# Patient Record
Sex: FEMALE | Race: WHITE | NOT HISPANIC OR LATINO | Employment: OTHER | URBAN - METROPOLITAN AREA
[De-identification: names, ages, dates, MRNs, and addresses within clinical notes are randomized per-mention and may not be internally consistent; named-entity substitution may affect disease eponyms.]

---

## 2022-11-12 ENCOUNTER — APPOINTMENT (OUTPATIENT)
Dept: CT IMAGING | Facility: CLINIC | Age: 77
End: 2022-11-12
Attending: EMERGENCY MEDICINE
Payer: COMMERCIAL

## 2022-11-12 ENCOUNTER — HOSPITAL ENCOUNTER (OUTPATIENT)
Facility: CLINIC | Age: 77
Setting detail: OBSERVATION
Discharge: HOME | End: 2022-11-13
Attending: EMERGENCY MEDICINE | Admitting: INTERNAL MEDICINE
Payer: COMMERCIAL

## 2022-11-12 ENCOUNTER — APPOINTMENT (OUTPATIENT)
Dept: MRI IMAGING | Facility: CLINIC | Age: 77
End: 2022-11-12
Attending: EMERGENCY MEDICINE
Payer: COMMERCIAL

## 2022-11-12 DIAGNOSIS — D32.9 MENINGIOMA (H): ICD-10-CM

## 2022-11-12 DIAGNOSIS — E04.1 THYROID NODULE: ICD-10-CM

## 2022-11-12 DIAGNOSIS — G45.9 TIA (TRANSIENT ISCHEMIC ATTACK): ICD-10-CM

## 2022-11-12 LAB
ALBUMIN UR-MCNC: NEGATIVE MG/DL
ANION GAP SERPL CALCULATED.3IONS-SCNC: 9 MMOL/L (ref 3–14)
APPEARANCE UR: CLEAR
APTT PPP: 25 SECONDS (ref 22–38)
ATRIAL RATE - MUSE: 57 BPM
BASOPHILS # BLD AUTO: 0 10E3/UL (ref 0–0.2)
BASOPHILS NFR BLD AUTO: 1 %
BILIRUB UR QL STRIP: NEGATIVE
BUN SERPL-MCNC: 16 MG/DL (ref 7–30)
CALCIUM SERPL-MCNC: 8.8 MG/DL (ref 8.5–10.1)
CHLORIDE BLD-SCNC: 109 MMOL/L (ref 94–109)
CHOLEST SERPL-MCNC: 166 MG/DL
CO2 SERPL-SCNC: 24 MMOL/L (ref 20–32)
COLOR UR AUTO: ABNORMAL
CREAT SERPL-MCNC: 0.8 MG/DL (ref 0.52–1.04)
DIASTOLIC BLOOD PRESSURE - MUSE: NORMAL MMHG
EOSINOPHIL # BLD AUTO: 0.2 10E3/UL (ref 0–0.7)
EOSINOPHIL NFR BLD AUTO: 3 %
ERYTHROCYTE [DISTWIDTH] IN BLOOD BY AUTOMATED COUNT: 13.3 % (ref 10–15)
GFR SERPL CREATININE-BSD FRML MDRD: 75 ML/MIN/1.73M2
GLUCOSE BLD-MCNC: 131 MG/DL (ref 70–99)
GLUCOSE BLDC GLUCOMTR-MCNC: 118 MG/DL (ref 70–99)
GLUCOSE BLDC GLUCOMTR-MCNC: 196 MG/DL (ref 70–99)
GLUCOSE UR STRIP-MCNC: NEGATIVE MG/DL
HCT VFR BLD AUTO: 44.3 % (ref 35–47)
HDLC SERPL-MCNC: 54 MG/DL
HGB BLD-MCNC: 14.7 G/DL (ref 11.7–15.7)
HGB UR QL STRIP: ABNORMAL
HOLD SPECIMEN: NORMAL
HOLD SPECIMEN: NORMAL
IMM GRANULOCYTES # BLD: 0 10E3/UL
IMM GRANULOCYTES NFR BLD: 0 %
INR PPP: 1.03 (ref 0.85–1.15)
INTERPRETATION ECG - MUSE: NORMAL
KETONES UR STRIP-MCNC: NEGATIVE MG/DL
LDLC SERPL CALC-MCNC: 77 MG/DL
LEUKOCYTE ESTERASE UR QL STRIP: NEGATIVE
LYMPHOCYTES # BLD AUTO: 3.7 10E3/UL (ref 0.8–5.3)
LYMPHOCYTES NFR BLD AUTO: 50 %
MCH RBC QN AUTO: 32.1 PG (ref 26.5–33)
MCHC RBC AUTO-ENTMCNC: 33.2 G/DL (ref 31.5–36.5)
MCV RBC AUTO: 97 FL (ref 78–100)
MONOCYTES # BLD AUTO: 0.5 10E3/UL (ref 0–1.3)
MONOCYTES NFR BLD AUTO: 7 %
MUCOUS THREADS #/AREA URNS LPF: PRESENT /LPF
NEUTROPHILS # BLD AUTO: 2.8 10E3/UL (ref 1.6–8.3)
NEUTROPHILS NFR BLD AUTO: 39 %
NITRATE UR QL: NEGATIVE
NONHDLC SERPL-MCNC: 112 MG/DL
NRBC # BLD AUTO: 0 10E3/UL
NRBC BLD AUTO-RTO: 0 /100
P AXIS - MUSE: 73 DEGREES
PH UR STRIP: 6 [PH] (ref 5–7)
PLATELET # BLD AUTO: 228 10E3/UL (ref 150–450)
POTASSIUM BLD-SCNC: 3.5 MMOL/L (ref 3.4–5.3)
PR INTERVAL - MUSE: 286 MS
QRS DURATION - MUSE: 86 MS
QT - MUSE: 434 MS
QTC - MUSE: 422 MS
R AXIS - MUSE: 6 DEGREES
RBC # BLD AUTO: 4.58 10E6/UL (ref 3.8–5.2)
RBC URINE: 5 /HPF
SARS-COV-2 RNA RESP QL NAA+PROBE: NEGATIVE
SODIUM SERPL-SCNC: 142 MMOL/L (ref 133–144)
SP GR UR STRIP: 1.02 (ref 1–1.03)
SQUAMOUS EPITHELIAL: 1 /HPF
SYSTOLIC BLOOD PRESSURE - MUSE: NORMAL MMHG
T AXIS - MUSE: 36 DEGREES
TRIGL SERPL-MCNC: 177 MG/DL
TROPONIN I SERPL HS-MCNC: 7 NG/L
UROBILINOGEN UR STRIP-MCNC: NORMAL MG/DL
VENTRICULAR RATE- MUSE: 57 BPM
WBC # BLD AUTO: 7.3 10E3/UL (ref 4–11)
WBC URINE: 1 /HPF

## 2022-11-12 PROCEDURE — 70496 CT ANGIOGRAPHY HEAD: CPT

## 2022-11-12 PROCEDURE — 99220 PR INITIAL OBSERVATION CARE,LEVEL III: CPT | Performed by: INTERNAL MEDICINE

## 2022-11-12 PROCEDURE — 250N000013 HC RX MED GY IP 250 OP 250 PS 637: Performed by: EMERGENCY MEDICINE

## 2022-11-12 PROCEDURE — C9803 HOPD COVID-19 SPEC COLLECT: HCPCS

## 2022-11-12 PROCEDURE — 255N000002 HC RX 255 OP 636: Performed by: EMERGENCY MEDICINE

## 2022-11-12 PROCEDURE — 250N000013 HC RX MED GY IP 250 OP 250 PS 637: Performed by: INTERNAL MEDICINE

## 2022-11-12 PROCEDURE — 70498 CT ANGIOGRAPHY NECK: CPT

## 2022-11-12 PROCEDURE — 85730 THROMBOPLASTIN TIME PARTIAL: CPT | Performed by: EMERGENCY MEDICINE

## 2022-11-12 PROCEDURE — 70450 CT HEAD/BRAIN W/O DYE: CPT

## 2022-11-12 PROCEDURE — 82310 ASSAY OF CALCIUM: CPT | Performed by: EMERGENCY MEDICINE

## 2022-11-12 PROCEDURE — 82962 GLUCOSE BLOOD TEST: CPT

## 2022-11-12 PROCEDURE — 85610 PROTHROMBIN TIME: CPT | Performed by: EMERGENCY MEDICINE

## 2022-11-12 PROCEDURE — 250N000009 HC RX 250: Performed by: EMERGENCY MEDICINE

## 2022-11-12 PROCEDURE — 84484 ASSAY OF TROPONIN QUANT: CPT | Performed by: EMERGENCY MEDICINE

## 2022-11-12 PROCEDURE — 93005 ELECTROCARDIOGRAM TRACING: CPT

## 2022-11-12 PROCEDURE — 250N000011 HC RX IP 250 OP 636: Performed by: EMERGENCY MEDICINE

## 2022-11-12 PROCEDURE — 80061 LIPID PANEL: CPT | Performed by: INTERNAL MEDICINE

## 2022-11-12 PROCEDURE — A9585 GADOBUTROL INJECTION: HCPCS | Performed by: EMERGENCY MEDICINE

## 2022-11-12 PROCEDURE — G0378 HOSPITAL OBSERVATION PER HR: HCPCS

## 2022-11-12 PROCEDURE — 99291 CRITICAL CARE FIRST HOUR: CPT | Mod: 25

## 2022-11-12 PROCEDURE — 70553 MRI BRAIN STEM W/O & W/DYE: CPT

## 2022-11-12 PROCEDURE — 36415 COLL VENOUS BLD VENIPUNCTURE: CPT | Performed by: EMERGENCY MEDICINE

## 2022-11-12 PROCEDURE — 81001 URINALYSIS AUTO W/SCOPE: CPT | Performed by: EMERGENCY MEDICINE

## 2022-11-12 PROCEDURE — 83036 HEMOGLOBIN GLYCOSYLATED A1C: CPT | Performed by: INTERNAL MEDICINE

## 2022-11-12 PROCEDURE — U0003 INFECTIOUS AGENT DETECTION BY NUCLEIC ACID (DNA OR RNA); SEVERE ACUTE RESPIRATORY SYNDROME CORONAVIRUS 2 (SARS-COV-2) (CORONAVIRUS DISEASE [COVID-19]), AMPLIFIED PROBE TECHNIQUE, MAKING USE OF HIGH THROUGHPUT TECHNOLOGIES AS DESCRIBED BY CMS-2020-01-R: HCPCS | Performed by: EMERGENCY MEDICINE

## 2022-11-12 PROCEDURE — 85025 COMPLETE CBC W/AUTO DIFF WBC: CPT | Performed by: EMERGENCY MEDICINE

## 2022-11-12 RX ORDER — NICOTINE POLACRILEX 4 MG
15-30 LOZENGE BUCCAL
Status: DISCONTINUED | OUTPATIENT
Start: 2022-11-12 | End: 2022-11-13 | Stop reason: HOSPADM

## 2022-11-12 RX ORDER — DEXTROSE MONOHYDRATE 25 G/50ML
25-50 INJECTION, SOLUTION INTRAVENOUS
Status: DISCONTINUED | OUTPATIENT
Start: 2022-11-12 | End: 2022-11-13 | Stop reason: HOSPADM

## 2022-11-12 RX ORDER — BLOOD SUGAR DIAGNOSTIC
STRIP MISCELLANEOUS
COMMUNITY
Start: 2021-06-02

## 2022-11-12 RX ORDER — IOPAMIDOL 755 MG/ML
75 INJECTION, SOLUTION INTRAVASCULAR ONCE
Status: COMPLETED | OUTPATIENT
Start: 2022-11-12 | End: 2022-11-12

## 2022-11-12 RX ORDER — ASPIRIN 325 MG
325 TABLET ORAL ONCE
Status: COMPLETED | OUTPATIENT
Start: 2022-11-12 | End: 2022-11-12

## 2022-11-12 RX ORDER — GLIPIZIDE 2.5 MG/1
2.5 TABLET, EXTENDED RELEASE ORAL
COMMUNITY
Start: 2021-12-01 | End: 2022-12-01

## 2022-11-12 RX ORDER — CLOPIDOGREL BISULFATE 75 MG/1
300 TABLET ORAL ONCE
Status: COMPLETED | OUTPATIENT
Start: 2022-11-12 | End: 2022-11-12

## 2022-11-12 RX ORDER — GADOBUTROL 604.72 MG/ML
5 INJECTION INTRAVENOUS ONCE
Status: COMPLETED | OUTPATIENT
Start: 2022-11-12 | End: 2022-11-12

## 2022-11-12 RX ADMIN — IOPAMIDOL 75 ML: 755 INJECTION, SOLUTION INTRAVENOUS at 17:21

## 2022-11-12 RX ADMIN — CLOPIDOGREL BISULFATE 300 MG: 75 TABLET ORAL at 23:42

## 2022-11-12 RX ADMIN — ASPIRIN 325 MG ORAL TABLET 325 MG: 325 PILL ORAL at 18:27

## 2022-11-12 RX ADMIN — GADOBUTROL 5 ML: 604.72 INJECTION INTRAVENOUS at 19:23

## 2022-11-12 RX ADMIN — SODIUM CHLORIDE 100 ML: 900 INJECTION INTRAVENOUS at 17:21

## 2022-11-12 ASSESSMENT — ENCOUNTER SYMPTOMS
NAUSEA: 0
WEAKNESS: 0
HEADACHES: 0
COUGH: 0
VOMITING: 0
CHILLS: 0
DIARRHEA: 0
SPEECH DIFFICULTY: 1
NUMBNESS: 0
FEVER: 0

## 2022-11-12 ASSESSMENT — ACTIVITIES OF DAILY LIVING (ADL)
ADLS_ACUITY_SCORE: 35

## 2022-11-12 ASSESSMENT — VISUAL ACUITY: OU: NORMAL ACUITY

## 2022-11-12 NOTE — PHARMACY-ADMISSION MEDICATION HISTORY
Pharmacy Medication History  Admission medication history interview status for the 11/12/2022  admission is complete. See EPIC admission navigator for prior to admission medications     Location of Interview: Patient room  Medication history sources: Patient and Care Everywhere    Significant changes made to the medication list:   Added glipizide.    Medication reconciliation completed by provider prior to medication history? No    Time spent in this activity: 2 minutes     Prior to Admission medications    Medication Sig Last Dose Taking? Auth Provider Long Term End Date   glipiZIDE (GLUCOTROL XL) 2.5 MG 24 hr tablet Take 2.5 mg by mouth 11/12/2022 at am Yes Reported, Patient Yes 12/1/22   blood glucose (PRECISION QID TEST) test strip 2 (two) times daily.   Reported, Patient         The information provided in this note is only as accurate as the sources available at the time of update(s)     Michelle Calderon, SilvioD, BCCCP

## 2022-11-12 NOTE — ED TRIAGE NOTES
Triage Assessment     Row Name 11/12/22 9611       Triage Assessment (Adult)    Airway WDL WDL       Respiratory WDL    Respiratory WDL WDL       Skin Circulation/Temperature WDL    Skin Circulation/Temperature WDL WDL       Cardiac WDL    Cardiac WDL WDL       Peripheral/Neurovascular WDL    Peripheral Neurovascular WDL WDL       Cognitive/Neuro/Behavioral WDL    Cognitive/Neuro/Behavioral WDL WDL

## 2022-11-12 NOTE — ED TRIAGE NOTES
Triage Assessment     Row Name 11/12/22 5406       Triage Assessment (Adult)    Airway WDL WDL       Respiratory WDL    Respiratory WDL WDL       Skin Circulation/Temperature WDL    Skin Circulation/Temperature WDL WDL       Cardiac WDL    Cardiac WDL WDL       Peripheral/Neurovascular WDL    Peripheral Neurovascular WDL WDL       Cognitive/Neuro/Behavioral WDL    Cognitive/Neuro/Behavioral WDL WDL

## 2022-11-12 NOTE — ED PROVIDER NOTES
"  History   Chief Complaint:  Stroke Symptoms       The history is provided by the patient and the spouse.      Dang Soni is a 77 year old female with history of type 2 diabetes who presents with stoke symptoms. The patient reports that while she was shopping with her  for [employer] decorations, she ws unable to verbal speak although she knew what she wanted to say. This episode started at 1655 today. She denies headache, numbness/tingling/weakness, unsteady gait during this time. She has never experienced this before in the past. She is currently experiencing residual difficulty speaking, needing to work hard to speak. Denies history of kidney issues or heart complications. She notes that her blood glucose has been relatively normal. Additionally she notes minimal tingling in two of her fingers on the right side \"on the way here\". Denies history of seizure. Denies fever, chills, nausea, vomiting, cough, or diarrhea.    COVID-19 and flu vaccination on Monday.     Review of Systems   Constitutional: Negative for chills and fever.   Respiratory: Negative for cough.    Gastrointestinal: Negative for diarrhea, nausea and vomiting.   Musculoskeletal: Negative for gait problem.   Neurological: Positive for speech difficulty. Negative for weakness, numbness and headaches.   All other systems reviewed and are negative.        Allergies:  The patient has no known allergies.     Medications:  Glucotrol     Past Medical History:     Skin cancer   Type 2 diabetes   JACKELYN    Past Surgical History:    Tonsillectomy  MOHs surgery   Breast excisional biopsy     Family History:    Colon cancer     Social History:  The patient presents to the ED via EMS  PCP: No primary care provider on file.     Physical Exam     Patient Vitals for the past 24 hrs:   BP Temp Pulse Resp SpO2 Weight   11/12/22 1815 (!) 174/85 -- 70 14 96 % --   11/12/22 1802 -- -- 67 19 95 % --   11/12/22 1800 (!) 183/90 -- 71 13 -- --   11/12/22 1758 (!) " 184/85 -- 67 12 94 % --   11/12/22 1743 -- -- 70 14 -- --   11/12/22 1730 (!) 172/75 97.8  F (36.6  C) 73 28 97 % --   11/12/22 1728 (!) 177/78 -- -- -- -- --   11/12/22 1715 (!) 178/78 -- 66 18 97 % --   11/12/22 1711 -- -- -- -- -- 56 kg (123 lb 7.3 oz)       Physical Exam  Constitutional:       General: She is not in acute distress.     Appearance: She is not diaphoretic.   HENT:      Head: Atraumatic.      Mouth/Throat:      Pharynx: No oropharyngeal exudate.   Eyes:      General: No scleral icterus.     Pupils: Pupils are equal, round, and reactive to light.      Comments: No nystagmus   Cardiovascular:      Rate and Rhythm: Normal rate and regular rhythm.      Heart sounds: Normal heart sounds.   Pulmonary:      Effort: No respiratory distress.      Breath sounds: Normal breath sounds.   Abdominal:      Palpations: Abdomen is soft.      Tenderness: There is no abdominal tenderness.   Musculoskeletal:         General: No tenderness.      Cervical back: Neck supple. No rigidity.      Right lower leg: No edema.      Left lower leg: No edema.   Skin:     General: Skin is warm.      Capillary Refill: Capillary refill takes less than 2 seconds.      Findings: No rash.   Neurological:      General: No focal deficit present.      Mental Status: She is oriented to person, place, and time.      Comments: Subtle word finding difficulties.  No facial asymmetry.  Strength 5 out of 5 and equal bilaterally over the arms and legs.  Finger-nose testing without dysmetria.  No arm drift.  Sensation light touch intact and symmetric over the face, arms, and legs.   Psychiatric:         Mood and Affect: Mood normal.         Behavior: Behavior normal.           Emergency Department Course   ECG  ECG results from 11/12/22   EKG 12-lead, tracing only     Value    Systolic Blood Pressure     Diastolic Blood Pressure     Ventricular Rate 57    Atrial Rate 57    AL Interval 286    QRS Duration 86        QTc 422    P Axis 73    R  AXIS 6    T Axis 36    Interpretation ECG      Sinus bradycardia with 1st degree A-V block  Otherwise normal ECG  No previous ECGs available  Confirmed by GENERATED REPORT, COMPUTER ([id number]),  NANCY CAO ([id number]) on 11/12/2022 5:53:41 PM         Imaging:  CTA Head Neck w Contrast   Final Result   IMPRESSION:    HEAD CT:   1.  No acute intracranial process.   2.  Presumed meningioma overlying the posterior lateral right frontal lobe without significant mass effect. Smaller presumed meningioma overlying the right middle frontal gyrus.   3.  Chronic right sphenoid sinusitis.      HEAD CTA:    1.  No branch occlusion, significant flow-limiting stenosis or aneurysm.   2.  Prominent cluster of venous structures in the anterior right sylvian fissure, presumably related to the hypervascular meningioma nearby.      NECK CTA:   1.  No measurable stenosis or dissection.   2.  2.4 x 1.4 mm nodule at the thyroid isthmus. Follow-up per criteria below.      REFERENCE:   Wellington RIVERA et al. Managing Incidental Thyroid Nodules Detected on Imaging: White Paper of the ACR Incidental Thyroid Findings Committee. JACR 2015; 12:143-150.      Incidental thyroid nodule detected on CT or MRI without suspicious findings. Applies to general population without limited life expectancy or significant comorbidities.      1.  Age greater than or equal to 35 years   2.  Less than 1.5 cm: No further evaluation.   3.  Greater than or equal to 1.5 cm: Evaluate with thyroid ultrasound.      4.  Head CT and CTA head and neck were discussed with Dr. Palomares at 17:52 hours      CT Head w/o Contrast   Final Result   IMPRESSION:    HEAD CT:   1.  No acute intracranial process.   2.  Presumed meningioma overlying the posterior lateral right frontal lobe without significant mass effect. Smaller presumed meningioma overlying the right middle frontal gyrus.   3.  Chronic right sphenoid sinusitis.      HEAD CTA:    1.  No branch occlusion, significant  flow-limiting stenosis or aneurysm.   2.  Prominent cluster of venous structures in the anterior right sylvian fissure, presumably related to the hypervascular meningioma nearby.      NECK CTA:   1.  No measurable stenosis or dissection.   2.  2.4 x 1.4 mm nodule at the thyroid isthmus. Follow-up per criteria below.      REFERENCE:   Wellington RIVERA et al. Managing Incidental Thyroid Nodules Detected on Imaging: White Paper of the ACR Incidental Thyroid Findings Committee. JACR 2015; 12:143-150.      Incidental thyroid nodule detected on CT or MRI without suspicious findings. Applies to general population without limited life expectancy or significant comorbidities.      1.  Age greater than or equal to 35 years   2.  Less than 1.5 cm: No further evaluation.   3.  Greater than or equal to 1.5 cm: Evaluate with thyroid ultrasound.      4.  Head CT and CTA head and neck were discussed with Dr. Palomares at 17:52 hours      MR Brain w/o & w Contrast    (Results Pending)     Report per radiology    Laboratory:  Labs Ordered and Resulted from Time of ED Arrival to Time of ED Departure   BASIC METABOLIC PANEL - Abnormal       Result Value    Sodium 142      Potassium 3.5      Chloride 109      Carbon Dioxide (CO2) 24      Anion Gap 9      Urea Nitrogen 16      Creatinine 0.80      Calcium 8.8      Glucose 131 (*)     GFR Estimate 75     ROUTINE UA WITH MICROSCOPIC REFLEX TO CULTURE - Abnormal    Color Urine Straw      Appearance Urine Clear      Glucose Urine Negative      Bilirubin Urine Negative      Ketones Urine Negative      Specific Gravity Urine 1.022      Blood Urine Small (*)     pH Urine 6.0      Protein Albumin Urine Negative      Urobilinogen Urine Normal      Nitrite Urine Negative      Leukocyte Esterase Urine Negative      Mucus Urine Present (*)     RBC Urine 5 (*)     WBC Urine 1      Squamous Epithelials Urine 1     GLUCOSE BY METER - Abnormal    GLUCOSE BY METER POCT 118 (*)    INR - Normal    INR 1.03      PARTIAL THROMBOPLASTIN TIME - Normal    aPTT 25     TROPONIN I - Normal    Troponin I High Sensitivity 7     COVID-19 VIRUS (CORONAVIRUS) BY PCR - Normal    SARS CoV2 PCR Negative     GLUCOSE MONITOR NURSING POCT   CBC WITH PLATELETS AND DIFFERENTIAL    WBC Count 7.3      RBC Count 4.58      Hemoglobin 14.7      Hematocrit 44.3      MCV 97      MCH 32.1      MCHC 33.2      RDW 13.3      Platelet Count 228      % Neutrophils 39      % Lymphocytes 50      % Monocytes 7      % Eosinophils 3      % Basophils 1      % Immature Granulocytes 0      NRBCs per 100 WBC 0      Absolute Neutrophils 2.8      Absolute Lymphocytes 3.7      Absolute Monocytes 0.5      Absolute Eosinophils 0.2      Absolute Basophils 0.0      Absolute Immature Granulocytes 0.0      Absolute NRBCs 0.0          Emergency Department Course:       Reviewed:  I reviewed nursing notes, vitals, past medical history and Care Everywhere    Assessments:  1705 I obtained history and examined the patient as noted above.     Consults:  [facility] and plan of care  1750 I consulted with radiology about the patient and plan of care.  1906 I consulted with Dr. James about the patient and plan of care    Interventions:  1827 Aspirin 325 mg PO    Disposition:  The patient was admitted to the hospital under the care of Dr. James.     Impression & Plan     CMS Diagnoses: The patient has stroke symptoms:         ED Stroke specific documentation           NIHSS PDF     Patient last known well time: 1655  ED Provider first to bedside at: 1705  CT Results received at: 1752    Thrombolytics:   Not given due to:   - Rapidly improving symptoms    If treating with thrombolytics: Ensure SBP<180 and DBP<105 prior to treatment with thrombolytics.  Administering thrombolytics after treatment with IV labetalol, hydralazine, or nicardipine is reasonable once BP control is established.    National Institutes of Health Stroke Scale  (Baseline)  Time Performed: 1712     Score    Level of consciousness: (0)   Alert, keenly responsive    LOC questions: (0)   Answers both questions correctly    LOC commands: (0)   Performs both tasks correctly    Best gaze: (0)   Normal    Visual: (0)   No visual loss    Facial palsy: (0)   Normal symmetrical movements    Motor arm (left): (0)   No drift    Motor arm (right): (0)   No drift    Motor leg (left): (0)   No drift    Motor leg (right): (0)   No drift    Limb ataxia: (0)   Absent    Sensory: (0)   Normal- no sensory loss    Best language: (1)   Mild to moderate aphasia    Dysarthria: (0)   Normal    Extinction and inattention: (0)   No abnormality        Total Score:  1        Stroke Mimics were considered (including migraine headache, seizure disorder, hypoglycemia (or hyperglycemia), head or spinal trauma, CNS infection, Toxin ingestion and shock state (e.g. sepsis) .    Medical Decision Making:  This patient is a 77-year-old woman who presents to the emergency department for evaluation of stroke symptoms.  This began suddenly about 15 minutes prior to arrival.  Her symptoms had improved over still present on arrival.  Tier 1 stroke protocol was initiated.  I appreciate input from radiology and neurology.  The patient continued to have improvement in her symptoms and required very close frequent repeat evaluations.  We will allow permissive hypertension for now.  The patient symptoms are now so mild and have rapidly improved so much that it is not recommended to give tenecteplase.  The patient and her family are in agreement with this plan.  If she worsens within the therapeutic window we will reconsider.    The patient was given aspirin and will be loaded with Plavix later.  MRI will be obtained tonight.    The patient will be admitted to the hospital service    Critical Care Time: was 35 minutes for this patient excluding procedures    Diagnosis:    ICD-10-CM    1. TIA (transient ischemic attack)   G45.9       2. Thyroid nodule  E04.1       3. Meningioma (H)  D32.9             Scribe Disclosure:    I, Oracio Harrell, am serving as a scribe at 6:04 PM on 11/12/2022 to document services personally performed by Uvaldo Palomares MD based on my observations and the provider's statements to me.             Uvaldo Palomares MD  11/12/22 1939

## 2022-11-13 ENCOUNTER — APPOINTMENT (OUTPATIENT)
Dept: CARDIOLOGY | Facility: CLINIC | Age: 77
End: 2022-11-13
Payer: COMMERCIAL

## 2022-11-13 ENCOUNTER — APPOINTMENT (OUTPATIENT)
Dept: CARDIOLOGY | Facility: CLINIC | Age: 77
End: 2022-11-13
Attending: INTERNAL MEDICINE
Payer: COMMERCIAL

## 2022-11-13 VITALS
RESPIRATION RATE: 18 BRPM | OXYGEN SATURATION: 95 % | HEART RATE: 60 BPM | BODY MASS INDEX: 28.88 KG/M2 | HEIGHT: 63 IN | SYSTOLIC BLOOD PRESSURE: 138 MMHG | TEMPERATURE: 97.5 F | DIASTOLIC BLOOD PRESSURE: 87 MMHG | WEIGHT: 163 LBS

## 2022-11-13 LAB
GLUCOSE BLDC GLUCOMTR-MCNC: 141 MG/DL (ref 70–99)
GLUCOSE BLDC GLUCOMTR-MCNC: 145 MG/DL (ref 70–99)
GLUCOSE BLDC GLUCOMTR-MCNC: 155 MG/DL (ref 70–99)
GLUCOSE BLDC GLUCOMTR-MCNC: 163 MG/DL (ref 70–99)
HBA1C MFR BLD: 7.3 % (ref 0–5.6)
LVEF ECHO: NORMAL

## 2022-11-13 PROCEDURE — 99291 CRITICAL CARE FIRST HOUR: CPT | Mod: GC | Performed by: PSYCHIATRY & NEUROLOGY

## 2022-11-13 PROCEDURE — 93272 ECG/REVIEW INTERPRET ONLY: CPT | Performed by: INTERNAL MEDICINE

## 2022-11-13 PROCEDURE — G0378 HOSPITAL OBSERVATION PER HR: HCPCS

## 2022-11-13 PROCEDURE — 250N000013 HC RX MED GY IP 250 OP 250 PS 637: Performed by: PSYCHIATRY & NEUROLOGY

## 2022-11-13 PROCEDURE — 250N000012 HC RX MED GY IP 250 OP 636 PS 637: Performed by: INTERNAL MEDICINE

## 2022-11-13 PROCEDURE — 96372 THER/PROPH/DIAG INJ SC/IM: CPT | Performed by: INTERNAL MEDICINE

## 2022-11-13 PROCEDURE — 93306 TTE W/DOPPLER COMPLETE: CPT

## 2022-11-13 PROCEDURE — 96372 THER/PROPH/DIAG INJ SC/IM: CPT

## 2022-11-13 PROCEDURE — 93270 REMOTE 30 DAY ECG REV/REPORT: CPT

## 2022-11-13 PROCEDURE — 93306 TTE W/DOPPLER COMPLETE: CPT | Mod: 26 | Performed by: INTERNAL MEDICINE

## 2022-11-13 PROCEDURE — 999N000226 HC STATISTIC SLP IP EVAL DEFER

## 2022-11-13 PROCEDURE — 99217 PR OBSERVATION CARE DISCHARGE: CPT | Performed by: INTERNAL MEDICINE

## 2022-11-13 PROCEDURE — 82962 GLUCOSE BLOOD TEST: CPT

## 2022-11-13 RX ORDER — CLOPIDOGREL BISULFATE 75 MG/1
75 TABLET ORAL DAILY
Qty: 21 TABLET | Refills: 0 | Status: SHIPPED | OUTPATIENT
Start: 2022-11-14

## 2022-11-13 RX ORDER — ATORVASTATIN CALCIUM 20 MG/1
20 TABLET, FILM COATED ORAL EVERY EVENING
Status: DISCONTINUED | OUTPATIENT
Start: 2022-11-13 | End: 2022-11-13 | Stop reason: HOSPADM

## 2022-11-13 RX ORDER — ASPIRIN 81 MG/1
81 TABLET, CHEWABLE ORAL DAILY
Status: DISCONTINUED | OUTPATIENT
Start: 2022-11-13 | End: 2022-11-13 | Stop reason: HOSPADM

## 2022-11-13 RX ORDER — ASPIRIN 81 MG/1
81 TABLET, CHEWABLE ORAL DAILY
Qty: 21 TABLET | Refills: 0 | Status: SHIPPED | OUTPATIENT
Start: 2022-11-14

## 2022-11-13 RX ORDER — CLOPIDOGREL BISULFATE 75 MG/1
75 TABLET ORAL DAILY
Status: DISCONTINUED | OUTPATIENT
Start: 2022-11-13 | End: 2022-11-13 | Stop reason: HOSPADM

## 2022-11-13 RX ORDER — LISINOPRIL 10 MG/1
10 TABLET ORAL DAILY
Status: DISCONTINUED | OUTPATIENT
Start: 2022-11-13 | End: 2022-11-13

## 2022-11-13 RX ORDER — ATORVASTATIN CALCIUM 20 MG/1
20 TABLET, FILM COATED ORAL EVERY EVENING
Qty: 30 TABLET | Refills: 0 | Status: SHIPPED | OUTPATIENT
Start: 2022-11-13

## 2022-11-13 RX ORDER — CLOPIDOGREL BISULFATE 75 MG/1
75 TABLET ORAL DAILY
Qty: 30 TABLET | Refills: 0 | Status: SHIPPED | OUTPATIENT
Start: 2022-11-14 | End: 2022-11-13

## 2022-11-13 RX ADMIN — ASPIRIN 81 MG CHEWABLE TABLET 81 MG: 81 TABLET CHEWABLE at 12:47

## 2022-11-13 RX ADMIN — INSULIN ASPART 1 UNITS: 100 INJECTION, SOLUTION INTRAVENOUS; SUBCUTANEOUS at 09:03

## 2022-11-13 RX ADMIN — INSULIN ASPART 1 UNITS: 100 INJECTION, SOLUTION INTRAVENOUS; SUBCUTANEOUS at 12:47

## 2022-11-13 RX ADMIN — CLOPIDOGREL BISULFATE 75 MG: 75 TABLET ORAL at 12:47

## 2022-11-13 ASSESSMENT — ACTIVITIES OF DAILY LIVING (ADL)
ADLS_ACUITY_SCORE: 35

## 2022-11-13 NOTE — ED NOTES
Patient reports need to urinate. Patient wheeled to BR on cart with EDT. Patient escorted into BR with standby assist. Gait stable. Patient void x 1. Urine sample collected, labeled and sent.     Patient taken to MRI on cart.

## 2022-11-13 NOTE — CONSULTS
[facility]    Stroke Consult Note    Reason for Consult:  High risk TIA    Chief Complaint: Stroke Symptoms       HPI  Dang Soni is a 78 YO F w/vascular RFs NIDDM2 who presents as a tier 1 stroke code for acute speech disturbance (expressive aphasia/word finding difficulties) while shopping with family. NCCT, CTA H/N with incidental meningioma, no high grade stenosis.     In ED /78. Initial labs unremarkable.     TIA Evaluation Summarized    MRI and/or Head CT 1.  No acute infarct, intracranial hemorrhage, or extra-axial fluid collection.  2.  Two homogeneously enhancing dural-based extra-axial right frontal lesions, most consistent with meningiomas. Mild regional mass effect without adjacent brain edema or midline shift.   Intracranial Vasculature 1.  No branch occlusion, significant flow-limiting stenosis or aneurysm.  2.  Prominent cluster of venous structures in the anterior right sylvian fissure, presumably related to the hypervascular meningioma nearby.   Cervical Vasculature 1.  No measurable stenosis or dissection.  2.  2.4 x 1.4 mm nodule at the thyroid isthmus. Follow-up per criteria below.     Echocardiogram    The visual ejection fraction is 60-65%.  The right ventricle is normal in structure, function and size.  There is mild biatrial enlargement.    Mild LA enlargement   EKG/Telemetry Sinus bradycardia with 1st degree A-V block   Otherwise normal ECG    Other Testing Not Applicable      LDL 11/12/2022: 77 mg/dL   A1C 11/12/2022: 7.3 %       ABCD2 Patients Score   Age ? 60 years 1 point 1   Blood Pressure     -SBP ? 140 or DBP ?  90    1 point 1   Clinical Features    -Unilateral weakness   -Speech disturbance w/o weakness    -Other    2 points  1 point    0 points 1   Duration of symptoms    ?60 minutes    10-59 minutes    <10 minutes   2 points  1 point  0 points 1   Diabetes  1 point 0   Patient s ABCD2 Score (0-7) = 4       Impression/Recommendations  #  "High risk TIA (ABCD 2 = 4). Acute speech disturbance highly suggestive of vascular event. TNK was discussed but not offered given non-disabling symptoms seen on exam - family agreed with this assessment as pt was essentially back to baseline on video exam. CTA H/N very reassuring against artery to artery emboli as etiology. Does have LA enlargement. Incidental RLE fibular distribution weakness noted on exam.   - ASA daily indefinitely (discussed 325 mg vs 81 mg following 21 days of DAPT with preference for 325 mg given wt > 60 kg)  - Plavix 300 mg load, 75 mg daily for 21 days per POINT/CHANCE trials  - Atorvastatin 20 mg daily initiated (LDL 70s)  - Daily BP log - if she is consistently hypertensive can consider lisinopril which may be renal protective given DM2  - Outpt goals: SBP < 130, LDL < 70, A1c < 7 as tolerated  - Outpt Stroke f/u ordered  - 30 day cardiac event monitor ordered  - Outpt Gen Eval for RLE fibular distribution chronic weakness seen on exam (PF/eversion weakness)  - From neuro perspective pt safe to dispo home, stroke neurology signing off - please page/call with questions/concerns    Patient Follow-up    - in 8 weeks with general neurology ([phone])    Thank you for this consult. No further stroke evaluation is recommended, so we will sign off. Please contact us with any additional questions.    The Stroke Staff is Dr. Medrano.    Gerson Little MD  Vascular Neurology Fellow  To page me or covering stroke neurology team member, click here: [email or web address]   Choose \"On Call\" tab at top, then search dropdown box for \"Neurology Adult\", select location, press Enter, then look for stroke/neuro ICU/telestroke.    _____________________________________________________    Clinically Significant Risk Factors Present on Admission                         Past Medical History   No past medical history on file.  Past Surgical History   No past surgical history on file.  Medications   Home Meds  Prior to Admission " medications    Medication Sig Start Date End Date Taking? Authorizing Provider   glipiZIDE (GLUCOTROL XL) 2.5 MG 24 hr tablet Take 2.5 mg by mouth 12/1/21 12/1/22 Yes Reported, Patient   blood glucose (PRECISION QID TEST) test strip 2 (two) times daily. 6/2/21   Reported, Patient       Scheduled Meds    aspirin  81 mg Oral Daily     atorvastatin  20 mg Oral QPM     clopidogrel  75 mg Oral Daily     insulin aspart  1-7 Units Subcutaneous TID AC     insulin aspart  1-5 Units Subcutaneous At Bedtime       Infusion Meds      PRN Meds  glucose **OR** dextrose **OR** glucagon    Allergies   No Known Allergies  Family History   No family history on file.  Social History        Review of Systems   The 10 point Review of Systems is negative other than noted in the HPI or here.        PHYSICAL EXAMINATION   Temp:  [97.5  F (36.4  C)-98.7  F (37.1  C)] 97.5  F (36.4  C)  Pulse:  [60-83] 60  Resp:  [12-28] 18  BP: (121-189)/(57-92) 138/87  SpO2:  [94 %-97 %] 95 %    Gen: NAD  HEENT: NCAT  CV: Well perfused  Lungs: No respiratory distress    Neuro  MS: AO x4, fluent speech, comprehension/naming/repetition intact  CN: PERRL, EOEMI, CN2-12 intact bilat  Motor: BUE, BLE 5/5, normal tone/bulk, focal R DF/eversion weakness that pt endorsed is chronic   Sensory: LT intact in all 4 limbs  Coordination: FTN, HTS intact bilat    Dysphagia Screen  Per Nursing    Stroke Scales    NIHSS  1a. Level of Consciousness 0-->Alert, keenly responsive   1b. LOC Questions 0-->Answers both questions correctly   1c. LOC Commands 0-->Performs both tasks correctly   2.   Best Gaze 0-->Normal   3.   Visual 0-->No visual loss   4.   Facial Palsy 0-->Normal symmetrical movements   5a. Motor Arm, Left 0-->No drift, limb holds 90 (or 45) degrees for full 10 secs   5b. Motor Arm, Right 0-->No drift, limb holds 90 (or 45) degrees for full 10 secs   6a. Motor Leg, Left 0-->No drift, leg holds 30 degree position for full 5 secs   6b. Motor Leg, right 0-->No  drift, leg holds 30 degree position for full 5 secs   7.   Limb Ataxia 0-->Absent   8.   Sensory 0-->Normal, no sensory loss   9.   Best Language 0-->No aphasia, normal   10. Dysarthria 0-->Normal   11. Extinction and Inattention  0-->No abnormality   Total 0 (11/13/22 0800)       Modified [place] Score (Pre-morbid)  0 - No symptoms.     Imaging  I personally reviewed all imaging; relevant findings per HPI.    Labs Data   CBC  Recent Labs   Lab 11/12/22 1713   WBC 7.3   RBC 4.58   HGB 14.7   HCT 44.3        Basic Metabolic Panel   Recent Labs   Lab 11/13/22  1207 11/13/22  0758 11/13/22  0741 11/12/22  2334 11/12/22 1713   NA  --   --   --   --  142   POTASSIUM  --   --   --   --  3.5   CHLORIDE  --   --   --   --  109   CO2  --   --   --   --  24   BUN  --   --   --   --  16   CR  --   --   --   --  0.80   * 145* 163*   < > 131*  118*   NICKO  --   --   --   --  8.8    < > = values in this interval not displayed.     Liver Panel  No results for input(s): PROTTOTAL, ALBUMIN, BILITOTAL, ALKPHOS, AST, ALT, BILIDIRECT in the last 168 hours.  INR    Recent Labs   Lab Test 11/12/22 1713   INR 1.03      Lipid Profile    Recent Labs   Lab Test 11/12/22 1713   CHOL 166   HDL 54   LDL 77   TRIG 177*     A1C    Recent Labs   Lab Test 11/12/22 1713   A1C 7.3*     Troponin    Recent Labs   Lab 11/12/22 1713   TROPONINIS 7

## 2022-11-13 NOTE — DISCHARGE SUMMARY
[facility]  Hospitalist Discharge Summary      Date of Admission:  11/12/2022  Date of Discharge:  11/13/2022  Discharging Provider: Kailee Raphael MD  Discharge Service: Hospitalist Service    Discharge Diagnoses   TIA  Meningioma    Follow-ups Needed After Discharge   Follow-up Appointments     Follow-up and recommended labs and tests       Follow up with primary care provider, Physician No Ref-Primary, within 7   days for hospital follow- up.  No follow up labs or test are needed.         Please follow-up on results of event monitor testing and reevaluate blood pressure        Discharge Disposition   Discharged to home  Condition at discharge: Stable      Hospital Course      Dang Soni is a 77 year old female with a history of DM type II who presented to the ED on 11/12/2022 with a sudden onset of word finding difficulty.      TIA vs CVA   At approximately 1655 the patient started to have issues with her speech.  She described it as having difficulty with word founding.  She states she knew what she wanted to say but was unable to form the words.  Patient denied any other neurologic symptoms.    Since arrival though her symptoms have been improving.  Given that patient was not given tPa despite being within the window  HEAD CT:  1.  No acute intracranial process.  2.  Presumed meningioma overlying the posterior lateral right frontal lobe without significant mass effect. Smaller presumed meningioma overlying the right middle frontal gyrus.  3.  Chronic right sphenoid sinusitis.  HEAD CTA:   1.  No branch occlusion, significant flow-limiting stenosis or aneurysm.  2.  Prominent cluster of venous structures in the anterior right sylvian fissure, presumably related to the hypervascular meningioma nearby.  NECK CTA:  1.  No measurable stenosis or dissection.  2.  2.4 x 1.4 mm nodule at the thyroid isthmus. Follow-up per criteria below.  She was admitted to observation, MRI brain was  done and did not show any stroke  Neurology was consulted and echocardiogram was obtained  Stroke neurology reevaluated patient today and started her on Plavix and aspirin.  Event monitor was placed and she was recommended outpatient follow-up  She will be on dual aspirin 81 mg and Plavix therapy for 21 days and after that  will go to aspirin 325 mg daily indefinitely  Statin medication was also started      Elevated blood pressures   Blood pressure was high during hospitalization but she has no prior history of hypertension  This can be followed up as an outpatient and patient will monitor at home    Meningioma  Seen incidentally on CT scan.  Patient is aware   - MRI brain as above  - Recommend outpatient follow up     Thyroid nodule   Seen incidentally on CT scan as well.  Measures 2.4 x 1.4 mm.  Patient is already aware of this and states she has had it partially resected in the past   - Outpatient follow up         Consultations This Hospital Stay   SPEECH LANGUAGE PATH ADULT IP CONSULT  SMOKING CESSATION PROGRAM IP CONSULT  NEUROLOGY IP STROKE CONSULT    Code Status   Full Code    Time Spent on this Encounter   IKailee MD, personally saw the patient today and spent greater than 30 minutes discharging this patient.       Kailee Raphael MD  [facility] EXTENDED RECOVERY AND SHORT STAY  [address] [place] [id number]  Phone: [phone]  ______________________________________________________________________    Physical Exam   Vital Signs: Temp: 97.5  F (36.4  C) Temp src: Oral BP: 138/87 Pulse: 60   Resp: 18 SpO2: 95 % O2 Device: None (Room air)    Weight: 163 lbs 0 oz  General Appearance:  no distress  Respiratory: Lungs clear  Cardiovascular: Rate controlled  GI: Nontender  Skin: No rash  Extremities: No edema         Primary Care Physician   Physician No Ref-Primary    Discharge Orders      Reason for your hospital stay    TIA     Follow-up and recommended labs  and tests     Follow up with primary care provider, Physician No Ref-Primary, within 7 days for hospital follow- up.  No follow up labs or test are needed.     Activity    Your activity upon discharge: activity as tolerated     Diet    Follow this diet upon discharge: Orders Placed This Encounter      Regular Diet Adult     Stroke Hospital Follow Up    [facility] will call you to coordinate care as prescribed by your provider. If you don t hear from a representative within 2 business days, please call [phone].         Significant Results and Procedures   Results for orders placed or performed during the hospital encounter of 11/12/22   CT Head w/o Contrast    Narrative    EXAM: CT HEAD W/O CONTRAST, CTA HEAD NECK W CONTRAST  LOCATION: [facility]  DATE/TIME: 11/12/2022 5:24 PM    INDICATION: stroke symptoms., Aphasia  COMPARISON: None.  CONTRAST: 75Omnipaque 350  TECHNIQUE: Head and neck CT angiogram with IV contrast. Noncontrast head CT followed by axial helical CT images of the head and neck vessels obtained during the arterial phase of intravenous contrast administration. Axial 2D reconstructed images and   multiplanar 3D MIP reconstructed images of the head and neck vessels were performed by the technologist. Dose reduction techniques were used. All stenosis measurements made according to NASCET criteria unless otherwise specified.    FINDINGS:   NONCONTRAST HEAD CT:   INTRACRANIAL CONTENTS: No intracranial hemorrhage, extraaxial collection, or mass effect.  No CT evidence of acute infarct. Normal parenchymal attenuation. Normal ventricles and sulci. There is a smoothly marginated extra-axial dural based lesion   overlying the right inferior frontal sulcus measuring 29 x 9 x 19 mm (CC by TR by AP). Smaller partially mineralized presumed meningioma overlying the right middle frontal gyrus.    VISUALIZED ORBITS/SINUSES/MASTOIDS: No intraorbital abnormality. Opacified right  sphenoid locule with osteitis. No middle ear or mastoid effusion.    BONES/SOFT TISSUES: No acute abnormality.    HEAD CTA:  ANTERIOR CIRCULATION: No stenosis/occlusion, aneurysm, or high flow vascular malformation. Standard [place] anatomy.    POSTERIOR CIRCULATION: No stenosis/occlusion, aneurysm, or high flow vascular malformation. Balanced vertebral arteries supply a normal basilar artery.     DURAL VENOUS SINUSES: Expected enhancement of the major dural venous sinuses.    NECK CTA:  RIGHT CAROTID: No measurable stenosis or dissection.    LEFT CAROTID: No measurable stenosis or dissection.    VERTEBRAL ARTERIES: No focal stenosis or dissection. Dominant left and smaller right vertebral arteries.    AORTIC ARCH: Classic aortic arch anatomy with no significant stenosis at the origin of the great vessels.    NONVASCULAR STRUCTURES: There is a 2.4 x 1.4 cm nodule in the thyroid isthmus.      Impression    IMPRESSION:   HEAD CT:  1.  No acute intracranial process.  2.  Presumed meningioma overlying the posterior lateral right frontal lobe without significant mass effect. Smaller presumed meningioma overlying the right middle frontal gyrus.  3.  Chronic right sphenoid sinusitis.    HEAD CTA:   1.  No branch occlusion, significant flow-limiting stenosis or aneurysm.  2.  Prominent cluster of venous structures in the anterior right sylvian fissure, presumably related to the hypervascular meningioma nearby.    NECK CTA:  1.  No measurable stenosis or dissection.  2.  2.4 x 1.4 mm nodule at the thyroid isthmus. Follow-up per criteria below.    REFERENCE:  Wellington RIVERA et al. Managing Incidental Thyroid Nodules Detected on Imaging: White Paper of the ACR Incidental Thyroid Findings Committee. JACR 2015; 12:143-150.    Incidental thyroid nodule detected on CT or MRI without suspicious findings. Applies to general population without limited life expectancy or significant comorbidities.    1.  Age greater than or equal  to 35 years  2.  Less than 1.5 cm: No further evaluation.  3.  Greater than or equal to 1.5 cm: Evaluate with thyroid ultrasound.    4.  Head CT and CTA head and neck were discussed with Dr. Palomares at 17:52 hours   CTA Head Neck w Contrast    Narrative    EXAM: CT HEAD W/O CONTRAST, CTA HEAD NECK W CONTRAST  LOCATION: [facility]  DATE/TIME: 11/12/2022 5:24 PM    INDICATION: stroke symptoms., Aphasia  COMPARISON: None.  CONTRAST: 75Omnipaque 350  TECHNIQUE: Head and neck CT angiogram with IV contrast. Noncontrast head CT followed by axial helical CT images of the head and neck vessels obtained during the arterial phase of intravenous contrast administration. Axial 2D reconstructed images and   multiplanar 3D MIP reconstructed images of the head and neck vessels were performed by the technologist. Dose reduction techniques were used. All stenosis measurements made according to NASCET criteria unless otherwise specified.    FINDINGS:   NONCONTRAST HEAD CT:   INTRACRANIAL CONTENTS: No intracranial hemorrhage, extraaxial collection, or mass effect.  No CT evidence of acute infarct. Normal parenchymal attenuation. Normal ventricles and sulci. There is a smoothly marginated extra-axial dural based lesion   overlying the right inferior frontal sulcus measuring 29 x 9 x 19 mm (CC by TR by AP). Smaller partially mineralized presumed meningioma overlying the right middle frontal gyrus.    VISUALIZED ORBITS/SINUSES/MASTOIDS: No intraorbital abnormality. Opacified right sphenoid locule with osteitis. No middle ear or mastoid effusion.    BONES/SOFT TISSUES: No acute abnormality.    HEAD CTA:  ANTERIOR CIRCULATION: No stenosis/occlusion, aneurysm, or high flow vascular malformation. Standard [place] anatomy.    POSTERIOR CIRCULATION: No stenosis/occlusion, aneurysm, or high flow vascular malformation. Balanced vertebral arteries supply a normal basilar artery.     DURAL VENOUS SINUSES:  Expected enhancement of the major dural venous sinuses.    NECK CTA:  RIGHT CAROTID: No measurable stenosis or dissection.    LEFT CAROTID: No measurable stenosis or dissection.    VERTEBRAL ARTERIES: No focal stenosis or dissection. Dominant left and smaller right vertebral arteries.    AORTIC ARCH: Classic aortic arch anatomy with no significant stenosis at the origin of the great vessels.    NONVASCULAR STRUCTURES: There is a 2.4 x 1.4 cm nodule in the thyroid isthmus.      Impression    IMPRESSION:   HEAD CT:  1.  No acute intracranial process.  2.  Presumed meningioma overlying the posterior lateral right frontal lobe without significant mass effect. Smaller presumed meningioma overlying the right middle frontal gyrus.  3.  Chronic right sphenoid sinusitis.    HEAD CTA:   1.  No branch occlusion, significant flow-limiting stenosis or aneurysm.  2.  Prominent cluster of venous structures in the anterior right sylvian fissure, presumably related to the hypervascular meningioma nearby.    NECK CTA:  1.  No measurable stenosis or dissection.  2.  2.4 x 1.4 mm nodule at the thyroid isthmus. Follow-up per criteria below.    REFERENCE:  Wellington RIVERA et al. Managing Incidental Thyroid Nodules Detected on Imaging: White Paper of the ACR Incidental Thyroid Findings Committee. JACR 2015; 12:143-150.    Incidental thyroid nodule detected on CT or MRI without suspicious findings. Applies to general population without limited life expectancy or significant comorbidities.    1.  Age greater than or equal to 35 years  2.  Less than 1.5 cm: No further evaluation.  3.  Greater than or equal to 1.5 cm: Evaluate with thyroid ultrasound.    4.  Head CT and CTA head and neck were discussed with Dr. Palomares at 17:52 hours   MR Brain w/o & w Contrast    Narrative    EXAM: MR BRAIN W/O and W CONTRAST  LOCATION: [facility]  DATE/TIME: 11/12/2022 7:24 PM    INDICATION: word finding difficulties  COMPARISON: CT/CTA  performed on the same day.  CONTRAST: 5mL gadavist  TECHNIQUE: Routine multiplanar multisequence head MRI without and with intravenous contrast.    FINDINGS:  INTRACRANIAL CONTENTS: No acute or subacute infarct. Homogeneously enhancing T2-intermediate dural based extra-axial right frontal lesions with the larger more inferior lesion measuring 1.0 x 2.5 x 2.0 centimeters and the smaller more superior partially   calcified lesion measuring 0.7 x 1.6 x 1.6 cm. Mild adjacent reactive dural thickening and enhancement. Mild associated regional mass effect without midline shift, herniation, or hydrocephalus. No adjacent brain edema. No acute intracranial hemorrhage or   extra-axial fluid collection. Scattered nonspecific T2/FLAIR hyperintensities within the cerebral white matter most consistent with mild chronic microvascular ischemic change. Normal ventricles and sulci. Normal position of the cerebellar tonsils.     SELLA: No abnormality accounting for technique.    OSSEOUS STRUCTURES/SOFT TISSUES: Normal marrow signal. The major intracranial vascular flow voids are maintained.     ORBITS: No abnormality accounting for technique.     SINUSES/MASTOIDS: Inspissated secretions and mucosal enhancement within the right sphenoid sinus. Mild bilateral ethmoid and maxillary sinus mucosal thickening. A small left mastoid effusion. No right mastoid effusion.        Impression    IMPRESSION:  1.  No acute infarct, intracranial hemorrhage, or extra-axial fluid collection.  2.  Two homogeneously enhancing dural-based extra-axial right frontal lesions, most consistent with meningiomas. Mild regional mass effect without adjacent brain edema or midline shift.   Echocardiogram Complete     Value    LVEF  60-65%    [facility]    [id number]  [id number]  [id number]  [id number]^IRIS^BRIE     [facility]  Echocardiography Laboratory  [address] [place] [address]     Name: LELE LANDON  MRN: [id number]  :  1945  Study Date: 11/13/2022 10:55 AM  Age: 77 yrs  Gender: Female  Patient Location: [facility]  Reason For Study: TIA  Ordering Physician: BRIE SIMMONS  Performed By: Tee Briggs     BSA: 1.8 m2  Height: 63 in  Weight: 163 lb  HR: 65  BP: 138/87 mmHg  ______________________________________________________________________________  Procedure  Complete Portable Echo Adult.  ______________________________________________________________________________  Interpretation Summary     The visual ejection fraction is 60-65%.  The right ventricle is normal in structure, function and size.  There is mild biatrial enlargement.  ______________________________________________________________________________  Left Ventricle  The left ventricle is normal in structure, function and size. There is normal  left ventricular wall thickness. Left ventricular systolic function is normal.  The visual ejection fraction is 60-65%. Left ventricular diastolic function is  indeterminate. No regional wall motion abnormalities noted.     Right Ventricle  The right ventricle is normal in structure, function and size.     Atria  There is mild biatrial enlargement. There is no color Doppler evidence of an  atrial shunt.     Mitral Valve  There is mild to moderate mitral annular calcification. There is trace mitral  regurgitation.     Tricuspid Valve  The tricuspid valve is normal in structure and function. There is mild (1+)  tricuspid regurgitation. The right ventricular systolic pressure is  approximated at 21.6 mmHg plus the right atrial pressure.     Aortic Valve  There is trivial trileaflet aortic sclerosis. No aortic regurgitation is  present.     Pulmonic Valve  The pulmonic valve is not well seen, but is grossly normal.     Vessels  Normal size aorta. Normal size ascending aorta.     Pericardium  There is no pericardial effusion.     Rhythm  Sinus rhythm was  noted.  ______________________________________________________________________________  MMode/2D Measurements & Calculations     IVSd: 0.98 cm  LVIDd: 4.9 cm  LVIDs: 2.8 cm  LVPWd: 0.96 cm  FS: 42.3 %  LV mass(C)d: 166.3 grams  LV mass(C)dI: 93.8 grams/m2  Ao root diam: 3.2 cm  LA dimension: 3.4 cm  asc Aorta Diam: 3.1 cm  LA/Ao: 1.0  LVOT diam: 2.1 cm  LVOT area: 3.4 cm2  LA Volume (BP): 67.3 ml  LA Volume Index (BP): 38.0 ml/m2  RWT: 0.39     Doppler Measurements & Calculations  MV E max delroy: 72.8 cm/sec  MV A max delroy: 115.4 cm/sec  MV E/A: 0.63  MV dec slope: 183.7 cm/sec2  PA acc time: 0.14 sec  TR max delroy: 232.1 cm/sec  TR max P.6 mmHg  E/E' avg: 15.5  Lateral E/e': 14.2  Medial E/e': 16.7     ______________________________________________________________________________  Report approved by: Nabil Hatch 2022 01:40 PM               Discharge Medications   Current Discharge Medication List      START taking these medications    Details   aspirin (ASA) 81 MG chewable tablet Take 1 tablet (81 mg) by mouth daily  Qty: 21 tablet, Refills: 0    Associated Diagnoses: TIA (transient ischemic attack)      atorvastatin (LIPITOR) 20 MG tablet Take 1 tablet (20 mg) by mouth every evening  Qty: 30 tablet, Refills: 0    Associated Diagnoses: TIA (transient ischemic attack)      clopidogrel (PLAVIX) 75 MG tablet Take 1 tablet (75 mg) by mouth daily  Qty: 21 tablet, Refills: 0    Associated Diagnoses: TIA (transient ischemic attack)         CONTINUE these medications which have NOT CHANGED    Details   glipiZIDE (GLUCOTROL XL) 2.5 MG 24 hr tablet Take 2.5 mg by mouth      blood glucose (PRECISION QID TEST) test strip 2 (two) times daily.           Allergies   No Known Allergies

## 2022-11-13 NOTE — PROGRESS NOTES
PRIOR TO DISCHARGE        Comments: List all goals to be met before discharge home       Free from ACUTE neuro deficits- met   Testing complete- met    Nurse to notify provider when observation goals have been met and patient is ready for discharge.

## 2022-11-13 NOTE — PLAN OF CARE
Orientation/Cognitive: Alert/Oriented, Neuros intact, PERRLA, GCS 15, NIHSS 0   Observation Goals (Met/ Not Met): In progress   Mobility Level/Assist Equipment: IND   Fall Risk (Y/N): N  Behavior Concerns: none, pt is calm/cooperative and pleasant   Pain Management: denies pain   Tele/VS/O2: VSS on RA, Tele: SR 1st degree AV block   ABNL Lab/BG: ,141; HBA1c 7.3, MRI: right frontal meningiomas   Diet: regular   Bowel/Bladder:  Continent   Skin Concerns: none   Drains/Devices: PIV, Tele   Tests/Procedures for next shift: ANURAG, Labs: Lipid panel, Stroke Neuro consult  Anticipated DC date & active delays: ~11/13/22 pending Neuro ax/lewis  Patient Stated Goal for Today: sleep

## 2022-11-13 NOTE — PLAN OF CARE
Summary: [id number] 11/13/2022  Orientation: A/O x4  Observation Goals (met & not met): met  Activity Level: ind  Fall Risk: no  Behavior & Aggression Tool Color: green  Pain Management: denies  ABNL VS/O2: VSS  ABNL Lab/BG:  and 155  Diet: reg  Bowel/Bladder: continent   Drains/Devices: no IV, MD aware  Tests/Procedures for next shift: discharge today  Anticipated DC date: today  Other Important Info: On tele 1st degree AV block. Neuro's are in take, q4. Discharge once echo resulted.Heart monitor ordered for discharge for 30 days.

## 2022-11-13 NOTE — PLAN OF CARE
Observation goals  PRIOR TO DISCHARGE        Comments: List all goals to be met before discharge home       Free from ACUTE neuro deficits :met   Testing complete : met   Other: NIHSS score 0, pt A/Ox4   Nurse to notify provider when observation goals have been met and patient is ready for discharge.

## 2022-11-13 NOTE — ED NOTES
[facility]  ED Nurse Handoff Report    ED Chief complaint: Stroke Symptoms      ED Diagnosis:   Final diagnoses:   TIA (transient ischemic attack)   Thyroid nodule   Meningioma (H)       Code Status: Full Code    Allergies: No Known Allergies    Patient Story: Pt presents with sudden onset of expressive aphasia while shopping today.  Focused Assessment:  Pt's expressive aphasia has resolved per nursing assessment however pt's family states they think she occasionally stumbles over her words. Pt had some right hand numbness. CT showed no acute stroke. MRI results negative. Pt is able to ambulate independently. Passed her swallow study and given 324mg ASA.    Treatments and/or interventions provided: see above  Patient's response to treatments and/or interventions: see above    To be done/followed up on inpatient unit:  none pending    Does this patient have any cognitive concerns?: none    Activity level - Baseline/Home:  Independent  Activity Level - Current:   Stand with Assist    Patient's Preferred language: English   Needed?: No    Isolation: None  Infection: Not Applicable  Patient tested for COVID 19 prior to admission: YES  Bariatric?: No    Vital Signs:   Vitals:    11/12/22 1758 11/12/22 1800 11/12/22 1802 11/12/22 1815   BP: (!) 184/85 (!) 183/90  (!) 174/85   Pulse: 67 71 67 70   Resp: 12 13 19 14   Temp:       SpO2: 94%  95% 96%   Weight:           Cardiac Rhythm:     Was the PSS-3 completed:   Yes  What interventions are required if any?               Family Comments:  present at bedside.  OBS brochure/video discussed/provided to patient/family: yes              Name of person given brochure if not patient: N/A              Relationship to patient: N/A    For the majority of the shift this patient's behavior was Green.   Behavioral interventions performed were information.    ED NURSE PHONE NUMBER: [phone]

## 2022-11-13 NOTE — PLAN OF CARE
Goal Outcome Evaluation:      Observation goals  PRIOR TO DISCHARGE        Comments: List all goals to be met before discharge home       Free from ACUTE neuro deficits :met   Testing complete : met   Other: NIHSS score 0, pt A/Ox4   Nurse to notify provider when observation goals have been met and patient is ready for discharge.

## 2022-11-13 NOTE — PROVIDER NOTIFICATION
MD Notification    Notified Person: MD    Notified Person Name: Dr. Raphael    Notification Date/Time: 11/13/22, 12:14    Notification Interaction: Page    Purpose of Notification: Pt is complaining of IV pain, neurology is ok for IV to come out and anticipate discharge. Do we need to start another IV once this one is out?    Orders Received: ok to keep IV out     Comments:

## 2022-11-13 NOTE — H&P
[facility]    History and Physical - Hospitalist Service       Date of Admission:  11/12/2022    Assessment & Plan      Dang Soni is a 77 year old female with a history of DM type II who presented to the ED on 11/12/2022 with a sudden onset of word finding difficulty.      TIA vs CVA   At approximately 1655 the patient started to have issues with her speech.  She describes it as having difficulty with word founding.  She states she knew what she wanted to say but was unable to form the words.  Patient denied any other neurologic symptoms.  Since arrival though her symptoms have been improving.  Given that patient was not given tPa despite being within the window  HEAD CT:  1.  No acute intracranial process.  2.  Presumed meningioma overlying the posterior lateral right frontal lobe without significant mass effect. Smaller presumed meningioma overlying the right middle frontal gyrus.  3.  Chronic right sphenoid sinusitis.  HEAD CTA:   1.  No branch occlusion, significant flow-limiting stenosis or aneurysm.  2.  Prominent cluster of venous structures in the anterior right sylvian fissure, presumably related to the hypervascular meningioma nearby.  NECK CTA:  1.  No measurable stenosis or dissection.  2.  2.4 x 1.4 mm nodule at the thyroid isthmus. Follow-up per criteria below.  - Admit to observation   - Q4h neuro checks   - MRI brain done and results are pending  - Echocardiogram ordered  - Neurology consulted     Elevated blood pressures   SBP in the 170s-180s.  No prior history of hypertension.  Suspect some component of physiologically being elevated   - At this time will let ride high given the possibility of stroke  - Re-assess tomorrow     Meningioma  Seen incidentally on CT scan.  Patient is aware   - MRI brain as above  - Recommend outpatient follow up     Thyroid nodule   Seen incidentally on CT scan as well.  Measures 2.4 x 1.4 mm.  Patient is already aware of this and states  she has had it partially resected in the past   - Outpatient follow up          Diet: NPO for Medical/Clinical Reasons Except for: No Exceptions    DVT Prophylaxis: Low Risk/Ambulatory with no VTE prophylaxis indicated  Lemus Catheter: Not present  Central Lines: None  Cardiac Monitoring: None  Code Status:   Full code     Clinically Significant Risk Factors Present on Admission                              Disposition Plan      Expected Discharge Date: 11/13/2022                The patient's care was discussed with the Patient, Patient's Family and ER physician.    Hansel James DO  Hospitalist Service  [facility]  Securely message with the Vocera Web Console (learn more here)  Text page via [facility] Paging/Directory         ______________________________________________________________________    Chief Complaint   Word finding difficulty     History is obtained from the patient    History of Present Illness   Dang Soni is a 77 year old female with a history of DM type II who presented to the ED on 11/12/2022 with a sudden onset of word finding difficulty.  At approximately 1655 the patient started to have issues with her speech. She states she knew what she wanted to say but was unable to form the appropriate words.  Patient denied any other neurologic symptoms.  Since arrival though her symptoms have been improving and her symptoms had nearly resolved.  She denies any fevers, chills, cough, sore throat, chest pain, SOB, abdominal pain, N/V/D or problems with urination    Review of Systems    The 10 point Review of Systems is negative other than noted in the HPI    Past Medical History    I have reviewed this patient's medical history and updated it with pertinent information if needed.   DM type II     Past Surgical History   I have reviewed this patient's surgical history and updated it with pertinent information if needed.  Thyroid nodule resection     Social History   I have  reviewed this patient's social history and updated it with pertinent information if needed.  . No tobacco use.  Occasional ETOH     Family History     Sister has a history of stroke     Prior to Admission Medications   Prior to Admission Medications   Prescriptions Last Dose Informant Patient Reported? Taking?   blood glucose (PRECISION QID TEST) test strip   Yes No   Si (two) times daily.   glipiZIDE (GLUCOTROL XL) 2.5 MG 24 hr tablet 2022 at am  Yes Yes   Sig: Take 2.5 mg by mouth      Facility-Administered Medications: None     Allergies   No Known Allergies    Physical Exam   Vital Signs: Temp: 97.8  F (36.6  C)   BP: (!) 174/85 Pulse: 70   Resp: 14 SpO2: 96 %      Weight: 123 lbs 7.32 oz    General Appearance: Resting comfortably. NAD   Eyes: EOMI.  Normal conjuctiva  HEENT: NC/AT.  Moist mucous membranes   Respiratory: Clear to auscultation.  No respiratory distress  Cardiovascular: RRR.  Appears well perfused  GI: Soft.  Non-distended  Skin: No obvious rashes or cyanosis to exposed skin  Musculoskeletal: No edema. No bony abnormalities  Neurologic: Speech is slow and deliberate but comprehensible and not obvious slurring.  CN appear intact.  Moving all extremities grossly  Psychiatric: Alert and oriented     Data   Data reviewed today: I reviewed all medications, new labs and imaging results over the last 24 hours. I personally reviewed CT results  EKG:  Sinus bradycardia with 1st degree AV block. No evidence of ischemia     Recent Labs   Lab 22  1713   WBC 7.3   HGB 14.7   MCV 97      INR 1.03      POTASSIUM 3.5   CHLORIDE 109   CO2 24   BUN 16   CR 0.80   ANIONGAP 9   NICKO 8.8   *  118*     Recent Results (from the past 24 hour(s))   CT Head w/o Contrast    Narrative    EXAM: CT HEAD W/O CONTRAST, CTA HEAD NECK W CONTRAST  LOCATION: [facility]  DATE/TIME: 2022 5:24 PM    INDICATION: stroke symptoms., Aphasia  COMPARISON:  None.  CONTRAST: 75Omnipaque 350  TECHNIQUE: Head and neck CT angiogram with IV contrast. Noncontrast head CT followed by axial helical CT images of the head and neck vessels obtained during the arterial phase of intravenous contrast administration. Axial 2D reconstructed images and   multiplanar 3D MIP reconstructed images of the head and neck vessels were performed by the technologist. Dose reduction techniques were used. All stenosis measurements made according to NASCET criteria unless otherwise specified.    FINDINGS:   NONCONTRAST HEAD CT:   INTRACRANIAL CONTENTS: No intracranial hemorrhage, extraaxial collection, or mass effect.  No CT evidence of acute infarct. Normal parenchymal attenuation. Normal ventricles and sulci. There is a smoothly marginated extra-axial dural based lesion   overlying the right inferior frontal sulcus measuring 29 x 9 x 19 mm (CC by TR by AP). Smaller partially mineralized presumed meningioma overlying the right middle frontal gyrus.    VISUALIZED ORBITS/SINUSES/MASTOIDS: No intraorbital abnormality. Opacified right sphenoid locule with osteitis. No middle ear or mastoid effusion.    BONES/SOFT TISSUES: No acute abnormality.    HEAD CTA:  ANTERIOR CIRCULATION: No stenosis/occlusion, aneurysm, or high flow vascular malformation. Standard [place] anatomy.    POSTERIOR CIRCULATION: No stenosis/occlusion, aneurysm, or high flow vascular malformation. Balanced vertebral arteries supply a normal basilar artery.     DURAL VENOUS SINUSES: Expected enhancement of the major dural venous sinuses.    NECK CTA:  RIGHT CAROTID: No measurable stenosis or dissection.    LEFT CAROTID: No measurable stenosis or dissection.    VERTEBRAL ARTERIES: No focal stenosis or dissection. Dominant left and smaller right vertebral arteries.    AORTIC ARCH: Classic aortic arch anatomy with no significant stenosis at the origin of the great vessels.    NONVASCULAR STRUCTURES: There is a 2.4 x 1.4 cm  nodule in the thyroid isthmus.      Impression    IMPRESSION:   HEAD CT:  1.  No acute intracranial process.  2.  Presumed meningioma overlying the posterior lateral right frontal lobe without significant mass effect. Smaller presumed meningioma overlying the right middle frontal gyrus.  3.  Chronic right sphenoid sinusitis.    HEAD CTA:   1.  No branch occlusion, significant flow-limiting stenosis or aneurysm.  2.  Prominent cluster of venous structures in the anterior right sylvian fissure, presumably related to the hypervascular meningioma nearby.    NECK CTA:  1.  No measurable stenosis or dissection.  2.  2.4 x 1.4 mm nodule at the thyroid isthmus. Follow-up per criteria below.    REFERENCE:  Wellington ARVIZUK et al. Managing Incidental Thyroid Nodules Detected on Imaging: White Paper of the ACR Incidental Thyroid Findings Committee. JACR 2015; 12:143-150.    Incidental thyroid nodule detected on CT or MRI without suspicious findings. Applies to general population without limited life expectancy or significant comorbidities.    1.  Age greater than or equal to 35 years  2.  Less than 1.5 cm: No further evaluation.  3.  Greater than or equal to 1.5 cm: Evaluate with thyroid ultrasound.    4.  Head CT and CTA head and neck were discussed with Dr. Palomares at 17:52 hours   CTA Head Neck w Contrast    Narrative    EXAM: CT HEAD W/O CONTRAST, CTA HEAD NECK W CONTRAST  LOCATION: [facility]  DATE/TIME: 11/12/2022 5:24 PM    INDICATION: stroke symptoms., Aphasia  COMPARISON: None.  CONTRAST: 75Omnipaque 350  TECHNIQUE: Head and neck CT angiogram with IV contrast. Noncontrast head CT followed by axial helical CT images of the head and neck vessels obtained during the arterial phase of intravenous contrast administration. Axial 2D reconstructed images and   multiplanar 3D MIP reconstructed images of the head and neck vessels were performed by the technologist. Dose reduction techniques were used. All  stenosis measurements made according to NASCET criteria unless otherwise specified.    FINDINGS:   NONCONTRAST HEAD CT:   INTRACRANIAL CONTENTS: No intracranial hemorrhage, extraaxial collection, or mass effect.  No CT evidence of acute infarct. Normal parenchymal attenuation. Normal ventricles and sulci. There is a smoothly marginated extra-axial dural based lesion   overlying the right inferior frontal sulcus measuring 29 x 9 x 19 mm (CC by TR by AP). Smaller partially mineralized presumed meningioma overlying the right middle frontal gyrus.    VISUALIZED ORBITS/SINUSES/MASTOIDS: No intraorbital abnormality. Opacified right sphenoid locule with osteitis. No middle ear or mastoid effusion.    BONES/SOFT TISSUES: No acute abnormality.    HEAD CTA:  ANTERIOR CIRCULATION: No stenosis/occlusion, aneurysm, or high flow vascular malformation. Standard [place] anatomy.    POSTERIOR CIRCULATION: No stenosis/occlusion, aneurysm, or high flow vascular malformation. Balanced vertebral arteries supply a normal basilar artery.     DURAL VENOUS SINUSES: Expected enhancement of the major dural venous sinuses.    NECK CTA:  RIGHT CAROTID: No measurable stenosis or dissection.    LEFT CAROTID: No measurable stenosis or dissection.    VERTEBRAL ARTERIES: No focal stenosis or dissection. Dominant left and smaller right vertebral arteries.    AORTIC ARCH: Classic aortic arch anatomy with no significant stenosis at the origin of the great vessels.    NONVASCULAR STRUCTURES: There is a 2.4 x 1.4 cm nodule in the thyroid isthmus.      Impression    IMPRESSION:   HEAD CT:  1.  No acute intracranial process.  2.  Presumed meningioma overlying the posterior lateral right frontal lobe without significant mass effect. Smaller presumed meningioma overlying the right middle frontal gyrus.  3.  Chronic right sphenoid sinusitis.    HEAD CTA:   1.  No branch occlusion, significant flow-limiting stenosis or aneurysm.  2.  Prominent cluster  of venous structures in the anterior right sylvian fissure, presumably related to the hypervascular meningioma nearby.    NECK CTA:  1.  No measurable stenosis or dissection.  2.  2.4 x 1.4 mm nodule at the thyroid isthmus. Follow-up per criteria below.    REFERENCE:  Wellington RIVERA et al. Managing Incidental Thyroid Nodules Detected on Imaging: White Paper of the ACR Incidental Thyroid Findings Committee. JACR 2015; 12:143-150.    Incidental thyroid nodule detected on CT or MRI without suspicious findings. Applies to general population without limited life expectancy or significant comorbidities.    1.  Age greater than or equal to 35 years  2.  Less than 1.5 cm: No further evaluation.  3.  Greater than or equal to 1.5 cm: Evaluate with thyroid ultrasound.    4.  Head CT and CTA head and neck were discussed with Dr. Palomares at 17:52 hours   MR Brain w/o & w Contrast    Narrative    EXAM: MR BRAIN W/O and W CONTRAST  LOCATION: [facility]  DATE/TIME: 11/12/2022 7:24 PM    INDICATION: word finding difficulties  COMPARISON: CT/CTA performed on the same day.  CONTRAST: 5mL gadavist  TECHNIQUE: Routine multiplanar multisequence head MRI without and with intravenous contrast.    FINDINGS:  INTRACRANIAL CONTENTS: No acute or subacute infarct. Homogeneously enhancing T2-intermediate dural based extra-axial right frontal lesions with the larger more inferior lesion measuring 1.0 x 2.5 x 2.0 centimeters and the smaller more superior partially   calcified lesion measuring 0.7 x 1.6 x 1.6 cm. Mild adjacent reactive dural thickening and enhancement. Mild associated regional mass effect without midline shift, herniation, or hydrocephalus. No adjacent brain edema. No acute intracranial hemorrhage or   extra-axial fluid collection. Scattered nonspecific T2/FLAIR hyperintensities within the cerebral white matter most consistent with mild chronic microvascular ischemic change. Normal ventricles and sulci. Normal  position of the cerebellar tonsils.     SELLA: No abnormality accounting for technique.    OSSEOUS STRUCTURES/SOFT TISSUES: Normal marrow signal. The major intracranial vascular flow voids are maintained.     ORBITS: No abnormality accounting for technique.     SINUSES/MASTOIDS: Inspissated secretions and mucosal enhancement within the right sphenoid sinus. Mild bilateral ethmoid and maxillary sinus mucosal thickening. A small left mastoid effusion. No right mastoid effusion.        Impression    IMPRESSION:  1.  No acute infarct, intracranial hemorrhage, or extra-axial fluid collection.  2.  Two homogeneously enhancing dural-based extra-axial right frontal lesions, most consistent with meningiomas. Mild regional mass effect without adjacent brain edema or midline shift.

## 2022-11-13 NOTE — PLAN OF CARE
Goal Outcome Evaluation:      Plan of Care Reviewed With: patient, spouse        Orientation/Cognitive: A+O x 4. Neuros intact, PERRLA   Observation Goals (Met/ Not Met): Met  Mobility Level/Assist Equipment: IND   Fall Risk (Y/N): N  Behavior Concerns: None    Pain Management: Denied  Tele/VS/O2: VSS on RA, Tele is SR   ABNL Lab/BG: See lab results. MRI: right frontal meningiomas   Diet: Regular  Bowel/Bladder:  Continent   Skin Concerns: None   Drains/Devices: No   Tests/Procedures for next shift: No. Pt is discharging  Anticipated DC date & active delays: Pt discharged to home, son provided a ride. AVS followed/reviewed, VU. ASA, Lipitor and Plavix education provided, VU.   Patient Stated Goal for Today: Discharge

## 2022-11-13 NOTE — PROVIDER NOTIFICATION
MD Notification    Notified Person: MD    Notified Person Name: Elva Beth     Notification Date/Time: 11/13/22 0131h     Notification Interaction: AMCOM     Purpose of Notification: FYI, pt passed dysphagia screening, current diet is NPO, are you ok to change this? Thanks     Orders Received: Regular diet    Comments:

## 2022-11-13 NOTE — PROGRESS NOTES
RECEIVING UNIT ED HANDOFF REVIEW    ED Nurse Handoff Report was reviewed by: Andrea Baker RN on November 12, 2022 at 11:04 PM

## 2022-11-13 NOTE — PLAN OF CARE
"SLP: Orders received per CVA protocol. Pt presented with episode of word finding difficulty/aphasia, though chart indicates resolution of symptoms. MRI results: \"1. No acute infarct, intracranial hemorrhage, or extra-axial fluid collection. 2. Two homogeneously enhancing dural-based extra-axial right frontal lesions, most consistent with meningiomas. Mild regional mass effect without adjacent brain edema or midline shift\" Pt passed RN dysphagia screen, tolerating a regular diet. Approached pt in room, introduced role of SLP re: communication and swallowing, pt declined any issues/concerns and pleasantly declines need for evaluation(s). Educated on signs/sx neuro changes re: communication/swallowing to monitor for, and to seek re-consult if any concerns arise (either IP vs OP) - pt verbalized understanding. Will complete orders.       "

## 2022-11-13 NOTE — CONSULTS
"[facility]    Stroke Consult Note    Reason for Consult: Stroke Code     Chief Complaint: Stroke Symptoms      HPI  Dang Soni is a 78 YO F w/vascular RFs NIDDM2 who presents as a tier 1 stroke code for acute speech disturbance (expressive aphasia/word finding difficulties) while shopping with family. NCCT, CTA H/N with incidental meningioma, no high grade stenosis.     In ED /78. Labs pending.     Imaging Findings  Imaging reviewed above.     Intravenous Thrombolysis  Not given due to:   - minor/isolated/quickly resolving symptoms    Endovascular Treatment  Not initiated due to absence of proximal vessel occlusion    Impression/Recommendations  # Small stroke vs high risk TIA (ABCD 2 = 4). Acute speech disturbance highly suggestive of vascular event. TNK was discussed but not offered given non-disabling symptoms seen on exam - family agreed with this assessment as pt was essentially back to baseline on video exam.   - Rec admission, please use stroke orderset stroke no TNK   - Q4H neurochecks, permissive HTN (no MRI yet)  - Rec  mg, plavix 300 mg load for tonight   - Labs: A1c, lipid panel, pBNP  - EKG, telemetry, TTE completet  - MRI brain w/o  - Stroke Neurology will see pt in AM    Patient Follow-up     - final recommendation pending work-up    Thank you for this consult. We will continue to follow.      The Stroke Staff is Dr. Tobin.    Gerson Little MD  Vascular Neurology Fellow  To page me or covering stroke neurology team member, click here: AMCOM   Choose \"On Call\" tab at top, then search dropdown box for \"Neurology Adult\", select location, press Enter, then look for stroke/neuro ICU/telestroke.    ______________________________________________________    Clinically Significant Risk Factors Present on Admission                         Past Medical History   No past medical history on file.  Past Surgical History   No past surgical history on file.  Medications "   Home Meds  Prior to Admission medications    Medication Sig Start Date End Date Taking? Authorizing Provider   glipiZIDE (GLUCOTROL XL) 2.5 MG 24 hr tablet Take 2.5 mg by mouth 12/1/21 12/1/22 Yes Reported, Patient   blood glucose (PRECISION QID TEST) test strip 2 (two) times daily. 6/2/21   Reported, Patient       Scheduled Meds      Infusion Meds      PRN Meds      Allergies   No Known Allergies  Family History   No family history on file.  Social History        Review of Systems   The 10 point Review of Systems is negative other than noted in the HPI or here.        PHYSICAL EXAMINATION  Temp:  [97.8  F (36.6  C)] 97.8  F (36.6  C)  Pulse:  [65-73] 65  Resp:  [12-28] 14  BP: (172-189)/(75-92) 189/92  SpO2:  [94 %-97 %] 97 %     Neuro  Exam performed as televideo exam    MS: AOx4, fluent speech, can follow two step commands, naming/repitition intact  CN: EOEMI, VF full bilat, symmetric facial movements  Motor: BUE, BLE antigravity with no drift  Sensory: LT intact in all 4 limbs   Coordination: FTN intact bilat    Dysphagia Screen  Per Nursing    Stroke Scales    NIHSS  1a. Level of Consciousness 0-->Alert, keenly responsive   1b. LOC Questions 0-->Answers both questions correctly   1c. LOC Commands 0-->Performs both tasks correctly   2.   Best Gaze 0-->Normal   3.   Visual 0-->No visual loss   4.   Facial Palsy 0-->Normal symmetrical movements   5a. Motor Arm, Left 0-->No drift, limb holds 90 (or 45) degrees for full 10 secs   5b. Motor Arm, Right 0-->No drift, limb holds 90 (or 45) degrees for full 10 secs   6a. Motor Leg, Left 0-->No drift, leg holds 30 degree position for full 5 secs   6b. Motor Leg, right 0-->No drift, leg holds 30 degree position for full 5 secs   7.   Limb Ataxia 0-->Absent   8.   Sensory 0-->Normal, no sensory loss   9.   Best Language 0-->No aphasia, normal   10. Dysarthria 0-->Normal   11. Extinction and Inattention  0-->No abnormality   Total 0 (11/12/22 2107)       Modified Sherman  Score (Pre-morbid)  0 - No symptoms.    Imaging  I personally reviewed all imaging; relevant findings per HPI.     Lab Results Data   CBC  Recent Labs   Lab 11/12/22  1713   WBC 7.3   RBC 4.58   HGB 14.7   HCT 44.3        Basic Metabolic Panel    Recent Labs   Lab 11/12/22  1713      POTASSIUM 3.5   CHLORIDE 109   CO2 24   BUN 16   CR 0.80   *  118*   NICKO 8.8     Liver Panel  No results for input(s): PROTTOTAL, ALBUMIN, BILITOTAL, ALKPHOS, AST, ALT, BILIDIRECT in the last 168 hours.  INR    Recent Labs   Lab Test 11/12/22  1713   INR 1.03      Lipid Profile  No lab results found.  A1C  No lab results found.  Troponin    Recent Labs   Lab 11/12/22  1713   TROPONINIS 7          Stroke Code Data Data   Stroke Code Data  (for stroke code with tele)  Stroke code activated 11/12/22   1710   First stroke provider response 11/12/22   1715   Video start time 11/12/22   1740   Video end time 11/12/22   1755   Last known normal 11/12/22   1650   Time of discovery  (or onset of symptoms)  11/12/22   1655   Head CT read by Stroke Neuro Dr/Provider 11/19/22   1735   Was stroke code de-escalated?   11/12/22 1800           Telestroke Service Details  Type of service telemedicine diagnostic assessment of acute neurological changes   Reason telemedicine is appropriate patient requires assessment with a specialist for diagnosis and treatment of neurological symptoms   Mode of transmission secure interactive audio and video communication per April   Originating site (patient location) [facility]    Distant site (provider location) Provider remote site

## 2023-02-12 ENCOUNTER — HEALTH MAINTENANCE LETTER (OUTPATIENT)
Age: 78
End: 2023-02-12

## 2023-08-13 ENCOUNTER — HEALTH MAINTENANCE LETTER (OUTPATIENT)
Age: 78
End: 2023-08-13

## 2024-10-06 ENCOUNTER — HEALTH MAINTENANCE LETTER (OUTPATIENT)
Age: 79
End: 2024-10-06